# Patient Record
(demographics unavailable — no encounter records)

---

## 2019-06-04 NOTE — PHYS DOC
Adult General


Chief Complaint


Chief Complaint:  SHORTNESS OF BREATH





HPI


HPI





Patient is a 78  year old male who presents with multiple complaints. Patient 

states that he has been having shortness of air, cough, dizziness that has been 

ongoing for 3 weeks. She also states that he's had a facial droop that started a

week ago, also has had right-sided weakness that started a week ago and that has

been making it difficult to eat in the mornings however it improves as the day 

goes on. Denies any pain currently. Has unknown what medicines this individual 

is on as he does not have medicine list. Hx of strokes. He is a poor historian.





Review of Systems


Review of Systems





Constitutional: Denies fever or chills []


Eyes: Denies change in visual acuity, redness, or eye pain []


HENT: Denies nasal congestion or sore throat []


Respiratory: Reports cough and shortness of breath []


Cardiovascular: No additional information not addressed in HPI []


GI: Denies abdominal pain, nausea, vomiting, bloody stools or diarrhea []


: Denies dysuria or hematuria []


Musculoskeletal: Denies back pain or joint pain []


Integument: Denies rash or skin lesions []


Neurologic: Reports dizziness that gets better as the day progresses. Denies 

headache, focal weakness or sensory changes []


Endocrine: Denies polyuria or polydipsia []





Complete systems were reviewed and found to be within normal limits, except as 

documented in this note.





Current Medications


Current Medications








Allergies


Allergies





Allergies








Coded Allergies Type Severity Reaction Last Updated Verified


 


  No Known Drug Allergies    19 No











Physical Exam


Physical Exam





Constitutional: Well developed, well nourished, no acute distress, non-toxic 

appearance. []


HENT: Normocephalic, atraumatic, bilateral external ears normal, oropharynx 

moist, no oral exudates, nose normal. []


Eyes: PERRLA, EOMI, conjunctiva normal, no discharge. [] 


Neck: Normal range of motion, no tenderness, supple, no stridor. [] 


Cardiovascular:Heart rate regular rhythm, no murmur []


Lungs & Thorax:  Bilateral breath sounds clear to auscultation []


Abdomen: Bowel sounds normal, soft, no tenderness, no masses, no pulsatile 

masses. [] 


Skin: Warm, dry, no erythema, no rash. [] 


Back: No tenderness, no CVA tenderness. [] 


Extremities: No tenderness, no cyanosis, no clubbing, ROM intact, no edema. [] 


Neurologic: Alert and oriented X 3, normal motor function, normal sensory 

function, no focal deficits noted. []


Psychologic: Affect normal, judgement normal, mood normal. []





NIHSS of 3.





Administer stroke scale items in the order listed.  Record performance in each 

category after each subscale exam.  Do not go back and change scores.  Follow 

directions provided for each exam technique.  Scores should reflect what the 

patient does, not what the clinician thinks the patient can do.  The clinician 

should record answers while administering the exam and work quickly. Except 

where indicated, the patient should not be coached (i.e., repeated requests to 

patient to make a special effort).








1a.    Level  of  Consciousness:  The  investigator  must  choose  a response if

 a full evaluation is prevented by such obstacles as an endotracheal tube, 

language barrier, orotracheal trauma/bandages.  A 3 is scored only if the 

patient makes no movement (other than reflexive posturing) in response to 

noxious stimulation.   





0 =   Alert; keenly responsive.


1 =   Not alert; but arousable by minor stimulation to obey, answer, or respond.


2 =   Not alert; requires repeated stimulation to attend, or is obtunded and 

requires strong or painful stimulation to make movements (not stereotyped).


3 =   Responds only with reflex motor or autonomic effects or totally 

unresponsive, flaccid, and areflexic.       





1b.  LOC Questions:  The patient is asked the month and his/her age. The answer 

must be correct - there is no partial credit for being close. Aphasic and 

stuporous patients who do not comprehend the questions will score 2.   Patients 

unable to speak because of endotracheal intubation, orotracheal trauma, severe 

dysarthria from any cause, language barrier, or any other problem not secondary 

to aphasia are given a 1.  It is important that only the initial answer be 

graded and that the examiner not "help" the patient with verbal or non-verbal 

cues.


   


0 =    Answers both questions correctly.


1 =    Answers one question correctly.


2 =    Answers neither question correctly.   





1c.   LOC Commands:    The patient is asked to open and close the eyes and then 

to  and release the non-paretic hand.   Substitute another one step command 

if the hands cannot be used.   Credit is given if an unequivocal attempt is made

 but not completed due to weakness.   If the patient does not respond to comm

and, the task should be demonstrated to him or her (pantomime), and the result 

scored  (i.e.,  follows  none,  one  or  two  commands).    Patients  with 

trauma, amputation, or other physical impediments should be given suitable one-

step commands. Only the first attempt is scored.





0 = Performs both tasks correctly.


1 = Performs one task correctly.


2 = Performs neither task correctly.   





2.   Best Gaze:   Only horizontal eye movements will be tested. Voluntary or 

reflexive (oculocephalic) eye movements will be scored, but  caloric  testing  

is  not  done.    If  the  patient  has  a  conjugate deviation of the eyes that

 can be overcome by voluntary or reflexive activity, the score will be 1.   If a

 patient has an isolated peripheral nerve paresis   (CN III, IV or VI), score a 

1.  Gaze is testable in all aphasic patients. Patients with ocular trauma, 

bandages, pre-existing blindness, or other disorder of visual acuity or fields 

should be tested with reflexive movements, and a choice made by the 

investigator. Establishing eye contact and then moving about the patient from 

side to side will occasionally clarify the presence of a partial gaze palsy.


   


0 = Normal.


1 = Partial gaze palsy; gaze is abnormal in one or both eyes, but forced 

deviation or total gaze paresis is not present.


2 = Forced deviation, or total gaze paresis not overcome by the oculocephalic 

maneuver.   











    


 








Interval:   [ ] Baseline   [ ] 2 hours post treatment   [ ] 24 hours post onset 

of symptoms 20 minutes   [ ] 7-10 days


[ ] 3 months   [ ] Other     (        )











3.  Visual:   Visual fields (upper and lower quadrants) are tested by 

confrontation, using finger counting or visual threat, as appropriate. Patients 

may be  encouraged, but if  they look at  the side of  the moving fingers 

appropriately, this can be scored as normal. If there is unilateral blindness or

enucleation, visual fields in the remaining eye are scored.   Score 1 only if a 

clear-cut asymmetry, including quadrantanopia, is found.  If patient is blind 

from any cause, score 3. Double simultaneous stimulation is performed at this 

point.  If there is extinction, patient receives a 1, and the results are used 

to respond to item 11.   


0 = No visual loss.





1 = Partial hemianopia.





2 = Complete hemianopia.





3 = Bilateral hemianopia (blind including cortical blindness).   





4.  Facial Palsy:  Ask - or use pantomime to encourage - the patient to show 

teeth or raise eyebrows and close eyes.  Score symmetry of grimace in response 

to noxious stimuli in the poorly responsive or non-comprehending patient.   If 

facial trauma/bandages, orotracheal tube, tape or other physical barriers 

obscure the face, these should be removed to the extent possible.   


0 = Normal symmetrical movements.


1 = Minor paralysis (flattened nasolabial fold, asymmetry on smiling).


2 = Partial paralysis (total or near-total paralysis of lower


face).


3 = Complete paralysis of one or both sides (absence of facial movement in the 

upper and lower face).   











    





5.  Motor Arm:  The limb is placed in the appropriate position: extend the  arms

 (palms  down)  90  degrees  (if  sitting)  or  45  degrees  (if supine).  Drift

is scored if the arm falls before 10 seconds.  The aphasic patient is encouraged

using urgency in the voice and pantomime, but not noxious stimulation.  Each 

limb is tested in turn, beginning with the non-paretic arm. Only in the case of 

amputation or joint fusion at the shoulder, the examiner should record the score

as untestable (UN), and clearly write the explanation for this choice.   


0 = No drift;  limb holds 90 (or 45) degrees for full 10 seconds.


1 = Drift; limb holds 90 (or 45) degrees, but drifts down before full 10 

seconds; does not hit bed or other support.


2 = Some effort against gravity; limb cannot get to or maintain (if cued) 90 (or

45) degrees, drifts down to bed, but has some effort against gravity.


3 = No effort against gravity; limb falls.


4 = No movement.


UN = Amputation or joint fusion, explain:      





5a. Left Arm





5b. Right Arm   





6.  Motor  Leg:   The limb is placed in the appropriate position:  hold the leg 

at 30 degrees (always tested supine).  Drift is scored if the leg falls before 5

seconds.  The aphasic patient is encouraged using urgency in the voice and 

pantomime, but not noxious stimulation. Each limb is tested in turn, beginning 

with the non-paretic leg.  Only in the case of amputation or joint fusion at the

hip, the examiner should record the score as untestable (UN), and clearly write 

the explanation for this choice.   


0 = No drift;  leg holds 30-degree position for full 5 seconds.


1 = Drift; leg falls by the end of the 5-second period but does not hit bed.


2 = Some effort against gravity; leg falls to bed by 5


seconds, but has some effort against gravity.


3 = No effort against gravity; leg falls to bed immediately.


4 = No movement.


UN = Amputation or joint fusion, explain:     





6a. Left Leg








6b. Right Leg   


 








Interval:   [ ] Baseline   [ ] 2 hours post treatment   [ ] 24 hours post onset 

of symptoms 20 minutes   [ ] 7-10 days


[ ] 3 months   [ ] Other     (        )





      





7. Limb Ataxia:  This item is aimed at finding evidence of a unilateral 

cerebellar lesion.   Test with eyes open.   In case of visual defect, ensure 

testing is done in intact visual field.   The finger-nose-finger and heel-shin 

tests are performed on both sides, and ataxia is scored only if present out of 

proportion to weakness.  Ataxia is absent in the patient who cannot understand 

or is paralyzed.  Only in the case of amputation or joint fusion, the examiner 

should record the score as untestable (UN), and clearly write the explanation 

for this choice.  In case  of  blindness,  test  by  having  the  patient  touch

 nose  from extended arm position.   


0 = Absent.





1 = Present in one limb.





2 = Present in two limbs.





UN = Amputation or joint fusion, explain:         





8.   Sensory:    Sensation or grimace to pinprick when tested, or withdrawal 

from noxious stimulus in the obtunded or aphasic patient. Only sensory loss 

attributed to stroke is scored as abnormal and the examiner should test as many 

body areas (arms [not hands], legs, trunk, face) as needed to accurately check 

for hemisensory loss.  A score of 2, "severe or total sensory loss," should only

be given when a severe or total loss of sensation can be clearly demonstrated. 

Stuporous and aphasic patients will, therefore, probably score 1 or 0. The 

patient with brainstem stroke who has bilateral loss of sensation is scored 2.  

If the patient does not respond and is quadriplegic, score


2.  Patients in a coma (item 1a=3) are automatically given a 2 on this


item.   


0 = Normal; no sensory loss.





1 = Mild-to-moderate sensory loss; patient feels pinprick is less sharp or is 

dull on the affected side; or there is a loss of superficial pain with pinprick,

but patient is aware of being touched.





2 = Severe to total sensory loss; patient is not aware of being touched in the 

face, arm, and leg.   





9. Best Language:  A great deal of information about comprehension will be 

obtained during the preceding sections of the examination. For this scale item, 

the patient is asked to describe what is happening in the attached picture, to 

name the items on the attached naming sheet  and  to  read  from  the  attached 

list  of  sentences. Comprehension is judged from responses here, as well as to 

all of the commands in the preceding general neurological exam.  If visual loss 

interferes with the tests, ask the patient to identify objects placed in the 

hand, repeat, and produce speech.   The intubated patient should be asked to 

write. The patient in a coma (item 1a=3) will automatically score 3 on this 

item.   The examiner must choose a score for the patient with stupor or limited 

cooperation, but a score of


3 should be used only if the patient is mute and follows no one-step commands.

   


0 = No aphasia; normal.





1 = Mild-to-moderate aphasia; some obvious loss of fluency or facility of 

comprehension, without significant limitation on ideas expressed or form of 

expression. Reduction of speech and/or comprehension, however, makes 

conversation about provided materials difficult or impossible. For example, in 

conversation about provided materials, examiner can identify picture or naming 

card content from patient's response.





2 = Severe aphasia; all communication is through fragmentary expression; great 

need for inference, questioning, and guessing by the listener. Range of 

information that can be exchanged is limited; listener carries burden of 

communication. Examiner cannot identify materials provided from patient 

response.





3 = Mute, global aphasia; no usable speech or auditory comprehension.   





10.   Dysarthria: If  patient is  thought to  be  normal, an  adequate sample of

speech must be obtained by asking patient to read or repeat  words  from  the  

attached  list.    If  the  patient  has  severe aphasia, the clarity of 

articulation of spontaneous speech can be rated.  Only if the patient is 

intubated or has other physical barriers to producing  speech,  the   examiner  

should  record  the   score  as untestable (UN), and clearly write an 

explanation for this choice.  Do not tell the patient why he or she is being 

tested.   


0 = Normal.


1 = Mild-to-moderate dysarthria; patient slurs at least some words and, at wors

t, can be understood with some difficulty.


2 = Severe dysarthria; patient's speech is so slurred as to be


unintelligible in the absence of or out of proportion to any dysphasia, or is 

mute/anarthric.


UN = Intubated or other physical barrier,


explain:       


 








Interval:   [ ] Baseline   [ ] 2 hours post treatment   [ ] 24 hours post onset 

of symptoms 20 minutes   [ ] 7-10 days


[ ] 3 months   [ ] Other     (        )











 


11.   Extinction and Inattention (formerly Neglect):    Sufficient information  

to  identify  neglect  may  be  obtained  during  the  prior testing.   If the 

patient has a severe visual loss preventing visual double simultaneous 

stimulation, and the cutaneous stimuli are normal, the score is normal.  If the 

patient has aphasia but does appear to attend to both sides, the score is jyothi

l.  The presence of visual spatial neglect or anosagnosia may also be taken as 

evidence of abnormality.  Since the abnormality is scored only if present, the 

item is never untestable.


 


0 = No abnormality.





1 = Visual, tactile, auditory, spatial, or personal inattention


or extinction to bilateral simultaneous stimulation in one       


of the sensory modalities.





2 = Profound alex-inattention or extinction to more than one modality; does not 

recognize own hand or orients to only one side of space.





Current Patient Data


Vital Signs





                                   Vital Signs








  Date Time  Temp Pulse Resp B/P (MAP) Pulse Ox O2 Delivery O2 Flow Rate FiO2


 


19 16:00 98.6 71 23 184/86 (118) 95 Room Air  





 98.6       








Lab Values





                                Laboratory Tests








Test


 19


16:12 19


16:17


 


White Blood Count


 6.0 x10^3/uL


(4.0-11.0) 





 


Red Blood Count


 2.00 x10^6/uL


(4.30-5.70)  L 





 


Hemoglobin


 6.0 g/dL


(13.0-17.5)  *L 





 


Hematocrit


 18.5 %


(39.0-53.0)  *L 





 


Mean Corpuscular Volume


 93 fL ()


 





 


Mean Corpuscular Hemoglobin 30 pg (25-35)   


 


Mean Corpuscular Hemoglobin


Concent 33 g/dL


(31-37) 





 


Red Cell Distribution Width


 14.9 %


(11.5-14.5)  H 





 


Platelet Count


 222 x10^3/uL


(140-400) 





 


Neutrophils (%) (Auto) 56 % (31-73)   


 


Lymphocytes (%) (Auto) 26 % (24-48)   


 


Monocytes (%) (Auto) 14 % (0-9)  H 


 


Eosinophils (%) (Auto) 3 % (0-3)   


 


Basophils (%) (Auto) 2 % (0-3)   


 


Neutrophils # (Auto)


 3.3 x10^3uL


(1.8-7.7) 





 


Lymphocytes # (Auto)


 1.6 x10^3/uL


(1.0-4.8) 





 


Monocytes # (Auto)


 0.8 x10^3/uL


(0.0-1.1) 





 


Eosinophils # (Auto)


 0.2 x10^3/uL


(0.0-0.7) 





 


Basophils # (Auto)


 0.1 x10^3/uL


(0.0-0.2) 





 


Prothrombin Time


 28.2 SEC


(11.7-14.0)  H 





 


Prothrombin Time INR


 2.7 (0.8-1.1)


H 





 


PTT


 43 SEC (24-38)


H 





 


Sodium Level


 142 mmol/L


(136-145) 





 


Potassium Level


 4.1 mmol/L


(3.5-5.1) 





 


Chloride Level


 107 mmol/L


() 





 


Carbon Dioxide Level


 24 mmol/L


(21-32) 





 


Anion Gap 11 (6-14)   


 


Blood Urea Nitrogen


 20 mg/dL


(8-26) 





 


Creatinine


 0.9 mg/dL


(0.7-1.3) 





 


Estimated GFR


(Cockcroft-Gault) 81.6  


 





 


BUN/Creatinine Ratio 22 (6-20)  H 


 


Glucose Level


 118 mg/dL


(70-99)  H 





 


Calcium Level


 8.3 mg/dL


(8.5-10.1)  L 





 


Total Bilirubin


 0.4 mg/dL


(0.2-1.0) 





 


Aspartate Amino Transferase


(AST) 21 U/L (15-37)


 





 


Alanine Aminotransferase (ALT)


 42 U/L (16-63)


 





 


Alkaline Phosphatase


 59 U/L


() 





 


Troponin I Quantitative


 < 0.017 ng/mL


(0.000-0.055) 





 


NT-Pro-B-Type Natriuretic


Peptide 3879 pg/mL


(0-449)  H 





 


Total Protein


 5.9 g/dL


(6.4-8.2)  L 





 


Albumin


 3.2 g/dL


(3.4-5.0)  L 





 


Albumin/Globulin Ratio 1.2 (1.0-1.7)   


 


Lipase


 143 U/L


() 





 


Urine Collection Type  Unknown  


 


Urine Color  Yellow  


 


Urine Clarity  Clear  


 


Urine pH  6.5  


 


Urine Specific Gravity  1.015  


 


Urine Protein


 


 30 mg/dL


(NEG-TRACE)


 


Urine Glucose (UA)


 


 Negative mg/dL


(NEG)


 


Urine Ketones (Stick)


 


 Negative mg/dL


(NEG)


 


Urine Blood


 


 Negative (NEG)





 


Urine Nitrite


 


 Negative (NEG)





 


Urine Bilirubin


 


 Negative (NEG)





 


Urine Urobilinogen Dipstick


 


 1.0 mg/dL (0.2


mg/dL)


 


Urine Leukocyte Esterase


 


 Negative (NEG)





 


Urine RBC


 


 1-2 /HPF (0-2)





 


Urine WBC


 


 Occ /HPF (0-4)





 


Urine Squamous Epithelial


Cells 


 Occ /LPF  





 


Urine Bacteria


 


 0 /HPF (0-FEW)








                                Laboratory Tests


19 16:12








                                Laboratory Tests


19 16:12











EKG


EKG


EKG interpreted by Dr. Schwab.[]





Left Bundle Branch Block, Sinus of 73, No STEMI





Radiology/Procedures


Radiology/Procedures


[]PATIENT: JOHN BECKMANACCOUNT: [id number]#: [id number]


: 1940           LOCATION: ER              AGE: 78


SEX: M                    EXAM DT: 19         ACCESSION#: [id number]


STATUS: REG ER            ORD. PHYSICIAN: JUAN DIEGO GLEASON   


REASON: R sided weakness and droop, short of air


PROCEDURE: CT ANGIOGRAPHY CHEST





CT ANGIOGRAPHY CHEST


 


Indication: Right-sided weakness. Shortness of breath. .


 


Technique: After intravenous contrast administration, CT imaging was 


performed of the chest. MIP reconstructions were obtained.


 


Exposure: One or more of the following individualized dose reduction 


techniques were utilized for this examination:  1. Automated exposure 


control  2. Adjustment of the mA and/or kV according to patient size  3. 


Use of iterative reconstruction technique.


 


No prior study for comparison


 


FINDINGS:


No evidence of pulmonary embolism. The ascending aorta measures 3.9 cm 


compatible with mild ectasia. No descending aortic aneurysm. Mild aortic 


calcification. There is a limited opacification of the aorta due to 


technique but no definite dissection.


 


No significant lymph node enlargement. No evidence of thyroid mass. 


Coronary artery calcification. Heart size mildly enlarged. No pericardial 


effusion.


 


Small left pleural effusion. Small-to-moderate right pleural effusion.


 


Prominent interstitial opacities in both lungs. There is some atelectasis.


No dense lobar consolidation. There does appear to be mild central 


pulmonary venous congestion. The trachea and mainstem bronchi are patent. 


Mild bronchial wall thickening bilaterally.


 


Mild degenerative changes of the spine. There is deformity of the inferior


sternum presumably due to an old fracture. No evidence of destructive bone


lesion.


 


Scans through the upper abdomen are limited by technique. There is a round


density within the gallbladder fossa compatible with a gallstone in a 


contracted gallbladder or possibly milk of calcium bile in a contracted 


gallbladder.


 


IMPRESSION:


1. No evidence of pulmonary embolism.


2. Bilateral pleural effusions.


3. Interstitial opacities in both lungs, with a probable component of mild


pulmonary edema and vascular congestion. Correlate clinically for 


congestive failure. Interstitial pneumonitis is also possible.


4. Round density within the gallbladder fossa. The gallbladder itself is 


difficult to define. This could represent a gallstone or dense bile within


a contracted gallbladder. Gallbladder ultrasound could further evaluate.


5. Mild ascending aorta ectasia.


 


Electronically signed by: Juan Diego Paniagua MD (2019 5:25 PM) [facility]











PATIENT: JOHN BECKMAN    ACCOUNT: [id number]     MRN#: [id number]


: 1940           LOCATION: ER              AGE: 78


SEX: M                    EXAM DT: 19         ACCESSION#: [id number]


STATUS: REG ER            ORD. PHYSICIAN: JUAN DIEGO GLEASON   


REASON: soa


PROCEDURE: PORTABLE CHEST 1V





PORTABLE CHEST 1V


 


History: Shortness of breath..


 


No prior for comparison. Cardiac silhouette is enlarged. Elevation of left


hemidiaphragm/lung base. No evidence of pneumothorax. No evidence of 


effusion. Diffuse interstitial opacities. No dense lobar airspace 


consolidation.


 


IMPRESSION: Diffuse interstitial opacities, could represent chronic 


fibrosis versus more acute edema/pneumonitis.


 


Electronically signed by: Juan Diego Paniagua MD (2019 5:15 PM) [facility]








PATIENT: JOHN BECKMAN    ACCOUNT: [id number]     MRN#: [id number]


: 1940           LOCATION: ER              AGE: 78


SEX: M                    EXAM DT: 19         ACCESSION#: [id number]


STATUS: REG ER            ORD. PHYSICIAN: JUAN DIEGO GLEASON   


REASON: R sided weakness and droop


PROCEDURE: CT HEAD WO CONTRAST





EXAM: Head CT without contrast.


 


HISTORY: Right-sided weakness and droop.


 


TECHNIQUE: Computed tomographic images of the head were obtained without 


contrast. 


*One or more of the following individualized dose reduction techniques 


were utilized for this examination:  


1. Automated exposure control.  


2. Adjustment of the mA and/or kV according to patient size.  


3. Use of iterative reconstruction technique.


 


COMPARISON: None.


 


FINDINGS: There is no acute or subacute extra-axial or intraparenchymal 


hemorrhage. There is no mass effect or midline shift. There is no 


hydrocephalus. 


 


There are areas of decreased attenuation within the cerebral white matter,


nonspecific and likely related to chronic small vessel disease. There is 


focal hypodensity within the left basal ganglia and adjacent centrum 


semiovale, likely due to chronic infarction. There are also suspected 


chronic infarct within the right basal ganglia an left ana.


 


There is paranasal sinus mucosal thickening. There is a chronic right 


lamina appreciable fracture. There is evidence of right lens surgery. The 


mastoid air cells are clear. There is no suspicious osseous lesion.


 


IMPRESSION:


1. No acute intracranial finding. Note is made that MRI is more sensitive 


for acute infarction.


2. Suspected chronic infarcts within the left basal ganglia and adjacent 


centrum semiovale, right basal ganglia and left ana. These can be better 


characterized with MRI.


3. Scattered areas of hypodensity within the cerebral white matter, likely


due to chronic small vessel disease.


 


Electronically signed by: Angela Frankel MD (2019 5:07 PM) [facility]





Course & Med Decision Making


Course & Med Decision Making


Pertinent Labs and Imaging studies reviewed. (See chart for details)





Discussed getting labs, CT, will call Dr. Barber's office for medicine list and 

history. 





Has hemoglobin of 6.0. Will type and cross and order 1 unit of RBC.  BNP is 

elevated.





Will call Dr. Barber for admission.





Dr. Barber agreed to admission. Will consult GI, and order lasix.





Dragon Disclaimer


Dragon Disclaimer


This electronic medical record was generated, in whole or in part, using a voice

 recognition dictation system.





Departure


Departure


Impression:  


   Primary Impression:  


   Anemia


   Additional Impression:  


   Acute exacerbation of CHF (congestive heart failure)


Disposition:   ADMITTED AS INPATIENT


Admitting Physician:  Juan Diego Barber


Referrals:  


JUAN DIEGO BARBER MD (PCP)





Problem Qualifiers








   Primary Impression:  


   Anemia


   Anemia type:  unspecified type  Qualified Codes:  D64.9 - Anemia, unspecified


   Additional Impression:  


   Acute exacerbation of CHF (congestive heart failure)


   Heart failure type:  unspecified  Qualified Codes:  I50.9 - Heart failure, 

   unspecified








JUAN DIEGO GLEASON           2019 16:22

## 2019-06-04 NOTE — RAD
PORTABLE CHEST 1V

 

History: Shortness of breath..

 

No prior for comparison. Cardiac silhouette is enlarged. Elevation of left

hemidiaphragm/lung base. No evidence of pneumothorax. No evidence of 

effusion. Diffuse interstitial opacities. No dense lobar airspace 

consolidation.

 

IMPRESSION: Diffuse interstitial opacities, could represent chronic 

fibrosis versus more acute edema/pneumonitis.

 

Electronically signed by: Juan Diego Paniagua MD (6/4/2019 5:15 PM) [facility]

## 2019-06-04 NOTE — EKG
[facility]

              [address] [place] [address]

Test Date:    2019               Test Time:    15:55:57

Pat Name:     JOHN BECKMAN            Department:   

Patient ID:   [id number]           Room:          

Gender:       M                        Technician:   

:          1940               Requested By: JONATHAN GLEASON

Order Number: [id number]           Reading MD:     

                                 Measurements

Intervals                              Axis          

Rate:         72                       P:            0

LA:           188                      QRS:          -28

QRSD:         138                      T:            83

QT:           410                                    

QTc:          455                                    

                           Interpretive Statements

SINUS RHYTHM

LEFTWARD AXIS

LEFT BUNDLE BRANCH BLOCK

ABNORMAL ECG

No previous ECG available for comparison

## 2019-06-04 NOTE — RAD
CT ANGIOGRAPHY CHEST

 

Indication: Right-sided weakness. Shortness of breath. .

 

Technique: After intravenous contrast administration, CT imaging was 

performed of the chest. MIP reconstructions were obtained.

 

Exposure: One or more of the following individualized dose reduction 

techniques were utilized for this examination:  1. Automated exposure 

control  2. Adjustment of the mA and/or kV according to patient size  3. 

Use of iterative reconstruction technique.

 

No prior study for comparison

 

FINDINGS:

No evidence of pulmonary embolism. The ascending aorta measures 3.9 cm 

compatible with mild ectasia. No descending aortic aneurysm. Mild aortic 

calcification. There is a limited opacification of the aorta due to 

technique but no definite dissection.

 

No significant lymph node enlargement. No evidence of thyroid mass. 

Coronary artery calcification. Heart size mildly enlarged. No pericardial 

effusion.

 

Small left pleural effusion. Small-to-moderate right pleural effusion.

 

Prominent interstitial opacities in both lungs. There is some atelectasis.

No dense lobar consolidation. There does appear to be mild central 

pulmonary venous congestion. The trachea and mainstem bronchi are patent. 

Mild bronchial wall thickening bilaterally.

 

Mild degenerative changes of the spine. There is deformity of the inferior

sternum presumably due to an old fracture. No evidence of destructive bone

lesion.

 

Scans through the upper abdomen are limited by technique. There is a round

density within the gallbladder fossa compatible with a gallstone in a 

contracted gallbladder or possibly milk of calcium bile in a contracted 

gallbladder.

 

IMPRESSION:

1. No evidence of pulmonary embolism.

2. Bilateral pleural effusions.

3. Interstitial opacities in both lungs, with a probable component of mild

pulmonary edema and vascular congestion. Correlate clinically for 

congestive failure. Interstitial pneumonitis is also possible.

4. Round density within the gallbladder fossa. The gallbladder itself is 

difficult to define. This could represent a gallstone or dense bile within

a contracted gallbladder. Gallbladder ultrasound could further evaluate.

5. Mild ascending aorta ectasia.

 

Electronically signed by: Juan Diego Paniagua MD (6/4/2019 5:25 PM) [facility]

## 2019-06-04 NOTE — RAD
EXAM: Head CT without contrast.

 

HISTORY: Right-sided weakness and droop.

 

TECHNIQUE: Computed tomographic images of the head were obtained without 

contrast. 

*One or more of the following individualized dose reduction techniques 

were utilized for this examination:  

1. Automated exposure control.  

2. Adjustment of the mA and/or kV according to patient size.  

3. Use of iterative reconstruction technique.

 

COMPARISON: None.

 

FINDINGS: There is no acute or subacute extra-axial or intraparenchymal 

hemorrhage. There is no mass effect or midline shift. There is no 

hydrocephalus. 

 

There are areas of decreased attenuation within the cerebral white matter,

nonspecific and likely related to chronic small vessel disease. There is 

focal hypodensity within the left basal ganglia and adjacent centrum 

semiovale, likely due to chronic infarction. There are also suspected 

chronic infarct within the right basal ganglia an left ana.

 

There is paranasal sinus mucosal thickening. There is a chronic right 

lamina appreciable fracture. There is evidence of right lens surgery. The 

mastoid air cells are clear. There is no suspicious osseous lesion.

 

IMPRESSION:

1. No acute intracranial finding. Note is made that MRI is more sensitive 

for acute infarction.

2. Suspected chronic infarcts within the left basal ganglia and adjacent 

centrum semiovale, right basal ganglia and left ana. These can be better 

characterized with MRI.

3. Scattered areas of hypodensity within the cerebral white matter, likely

due to chronic small vessel disease.

 

Electronically signed by: Angela Frankel MD (6/4/2019 5:07 PM) [facility]

## 2019-06-05 NOTE — HP
ADMIT DATE:  2019



CHIEF COMPLAINT:  Shortness of breath.



HISTORY OF PRESENT ILLNESS AND HOSPITAL COURSE:  This patient is a 78-year-old

 male with known history of coronary artery disease, previous CVA and

chronic atrial fibrillation, came to the office complaining of shortness of

breath.  He was found to have a 10-pound weight gain and crackles on

auscultation and evidence of congestive heart failure.  Therefore, he was

transferred directly to the Emergency Room for further evaluation.  Of note, the

patient's INR was 2.3 and stable.  During hospital evaluation, he was found to

have significant anemia of hemoglobin less than 7 as well as evidence of

congestive heart failure on chest x-ray and BNP.  Due to these findings, he was

directly admitted to the hospital for further evaluation initially by GI

Medicine and subsequently by Cardiology.  He was diuresed and given transfusion

and admitted to a monitored bed.



PAST MEDICAL HISTORY:  Significant for:

1.  Coronary artery disease, status post MI.

2.  Chronic atrial fibrillation.

3.  Previous cerebrovascular accident.

4.  Type 2 diabetes.

5.  Hypertension.

6.  High cholesterol.

7.  BPH.



ALLERGIES:  The patient exhibits no allergies.



FAMILY HISTORY:  Significant for mother who was  with complications of

diabetes and heart disease; father who was  with complications of

prostate cancer and heart disease; a brother who was  with diabetes and

heart disease and a sister who was  with complications of diabetes.



SOCIAL HISTORY:  The patient has never smoked.  He does not use alcohol

significantly; did use alcohol 15 years ago.  The patient lives alone, but has

excellent social support from his daughter and family members.  The patient

recently traveled from [place] and since that trip, he has been having increasing

shortness of breath over the last 2 weeks.



MEDICATIONS  ON ADMISSION:  Lisinopril 10 mg daily, metoprolol 50 mg b.i.d.,

Coumadin 2 mg daily, metformin 500 mg b.i.d., atorvastatin 20 mg daily,

omeprazole 20 mg daily, Myrbetriq 25 mg daily, tamsulosin 0.4 mg 2 tablets

daily, finasteride 5 mg daily.



REVIEW OF SYSTEMS:  Significant for recent weight gain of approximately 10

pounds, increasing shortness of breath, decreased ability to walk and

right-sided weakness consistent with his previous CVA, shortness of breath and

family states that the patient has been somewhat jaundiced appearing in the last

week or so.  The patient denies any nausea, vomiting, diarrhea, fever, cough, or

congestion.



PHYSICAL EXAMINATION:

GENERAL:  This is a well-nourished 78-year-old  male, in mild distress. 

He is alert and oriented x 3.

HEENT:  Benign with facial droop from previous cerebrovascular accident.

CARDIAC:  Irregularly irregular with a grade 2/3 systolic ejection murmur.

LUNGS:  Clear anteriorly with crackles in the bases.

ABDOMEN:  Soft, nontender.

EXTREMITIES:  There are 2+ pulses with 2+ pitting edema.

NEUROLOGIC:  Showed previous CVA findings and some right-sided weakness to his

arm more than usual.



ASSESSMENT:

1.  Anemia of unknown etiology.

2.  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

failure.

3.  Type 2 diabetes.

4.  Hypertension.

5.  High cholesterol.

6.  Chronic atrial fibrillation.

7.  Previous cerebrovascular accident with some right-sided residual.

8.  Coronary artery disease.

9.  Benign prostatic hypertrophy with history of retention.

10.  Long-term anticoagulation on Coumadin.

11.  Ejection fraction of 30%.

12.  Mild pulmonary hypertension.



PLAN:  To proceed with cardiology consultation, diurese the patient and proceed

with blood transfusion as ordered by ER and monitor the patient's symptoms.

 



______________________________

JONATHAN LU MD



DR:  LYNDSAY/pema  JOB#:  [id number] / [id number]

DD:  2019 13:10  DT:  2019 18:54

## 2019-06-05 NOTE — PDOC2
JULI CAST APRN 6/5/19 1445:


CARDIAC CONSULT


DATE OF CONSULT


Date of Consult


DATE: 6/5/19 


TIME: 14:24





REASON FOR CONSULT


Reason for Consult:


CHF





REFERRING PHYSICIAN


Referring Physician:


Elisabeth





SOURCE


Source:  Chart review, Patient





HISTORY OF PRESENT ILLNESS


HISTORY OF PRESENT ILLNESS


This is a pleasant 79 yo  male admitted for complains of SOA and 

weakness. Also complains of stool being black.  He has been having orthopnea and

ZAYAS in the last 2 days with some leg swelling.  He has been taking warfarin for 

his AFIB and currently he does not follow any cardiologist.  Denies any chest 

pain or palpitations.  He does have hx of CVA and CAD. No passing out or 

frequent dizziness. No prior hx of GI bleed. No abdominal pain but noted his 

stool to be black.





PAST MEDICAL HISTORY


Cardiovascular:  AFIB, CAD, HTN, MI, Hyperlipidemia


CENTRAL NERVOUS SYSTEM:  CVA


Renal/:  Benign prostatic enlarg.


Endocrine:  Diabetes (2)





PAST SURGICAL HISTORY


Past Surgical History:  No pertinent history





FAMILY HISTORY


Family History:  Diabetes, Heart Disease





SOCIAL HISTORY


Smoke:  No


ALCOHOL:  occassional


Drugs:  None


Lives:  with Family





CURRENT MEDICATIONS


CURRENT MEDICATIONS





Current Medications








 Medications


  (Trade)  Dose


 Ordered  Sig/Shyanne


 Route


 PRN Reason  Start Time


 Stop Time Status Last Admin


Dose Admin


 


 Iohexol


  (Omnipaque 350


 Mg/ml)  90 ml  1X  ONCE


 IV


   6/4/19 17:00


 6/4/19 17:01 DC 6/4/19 17:00





 


 Furosemide


  (Lasix)  20 mg  1X  ONCE


 IVP


   6/4/19 17:45


 6/4/19 17:46 DC 6/4/19 17:56





 


 Pantoprazole


 Sodium


  (Protonix)  40 mg  DAILY08


 PO


   6/4/19 21:00


    6/5/19 10:16





 


 Finasteride


  (Proscar)  5 mg  DAILY


 PO


   6/5/19 10:00


    6/5/19 10:16





 


 Lisinopril


  (Prinivil)  10 mg  DAILY


 PO


   6/5/19 10:00


    6/5/19 10:18





 


 Metoprolol


 Tartrate


  (Lopressor)  50 mg  BID


 PO


   6/5/19 10:00


    6/5/19 10:16





 


 Tamsulosin HCl


  (Flomax)  0.8 mg  DAILY


 PO


   6/5/19 10:00


    6/5/19 10:16














ALLERGIES


ALLERGIES:  


Coded Allergies:  


     No Known Drug Allergies (Unverified , 6/4/19)





ROS


Review of System


14 point ROs evaluated with pertinent positives noted per HPI





PHYSICAL EXAM


General:  Alert, Oriented X3, Cooperative, No acute distress


HEENT:  Atraumatic, Mucous membr. moist/pink


Lungs:  Other (faint basilar crackles)


Heart:  Regular rate (Sr with LBBB), Other (3/6 systolic murmur to ARYAN border)


Abdomen:  Soft, No tenderness


Extremities:  No cyanosis, No edema


Skin:  No breakdown, No significant lesion


Neuro:  Normal speech, Sensation intact


Psych/Mental Status:  Mental status NL, Mood NL


MUSCULOSKELETAL:  Osteoarthritic changes both hands





VITALS


VITALS





Vital Signs








  Date Time  Temp Pulse Resp B/P (MAP) Pulse Ox O2 Delivery O2 Flow Rate FiO2


 


6/5/19 11:19 97.9 67 18 156/77 (103) 96 Room Air  





 97.9       


 


6/5/19 07:25       2 











LABS


Lab:





Laboratory Tests








Test


 6/4/19


16:12 6/4/19


16:17 6/4/19


21:31 6/5/19


02:55


 


White Blood Count


 6.0 x10^3/uL


(4.0-11.0) 


 


 6.8 x10^3/uL


(4.0-11.0)


 


Red Blood Count


 2.00 x10^6/uL


(4.30-5.70) 


 


 2.74 x10^6/uL


(4.30-5.70)


 


Hemoglobin


 6.0 g/dL


(13.0-17.5) 


 


 8.2 g/dL


(13.0-17.5)


 


Hematocrit


 18.5 %


(39.0-53.0) 


 


 24.3 %


(39.0-53.0)


 


Mean Corpuscular Volume 93 fL ()    89 fL () 


 


Mean Corpuscular Hemoglobin 30 pg (25-35)    30 pg (25-35) 


 


Mean Corpuscular Hemoglobin


Concent 33 g/dL


(31-37) 


 


 34 g/dL


(31-37)


 


Red Cell Distribution Width


 14.9 %


(11.5-14.5) 


 


 16.4 %


(11.5-14.5)


 


Platelet Count


 222 x10^3/uL


(140-400) 


 


 205 x10^3/uL


(140-400)


 


Neutrophils (%) (Auto) 56 % (31-73)    48 % (31-73) 


 


Lymphocytes (%) (Auto) 26 % (24-48)    34 % (24-48) 


 


Monocytes (%) (Auto) 14 % (0-9)    15 % (0-9) 


 


Eosinophils (%) (Auto) 3 % (0-3)    3 % (0-3) 


 


Basophils (%) (Auto) 2 % (0-3)    1 % (0-3) 


 


Neutrophils # (Auto)


 3.3 x10^3uL


(1.8-7.7) 


 


 3.2 x10^3uL


(1.8-7.7)


 


Lymphocytes # (Auto)


 1.6 x10^3/uL


(1.0-4.8) 


 


 2.3 x10^3/uL


(1.0-4.8)


 


Monocytes # (Auto)


 0.8 x10^3/uL


(0.0-1.1) 


 


 1.0 x10^3/uL


(0.0-1.1)


 


Eosinophils # (Auto)


 0.2 x10^3/uL


(0.0-0.7) 


 


 0.2 x10^3/uL


(0.0-0.7)


 


Basophils # (Auto)


 0.1 x10^3/uL


(0.0-0.2) 


 


 0.1 x10^3/uL


(0.0-0.2)


 


Prothrombin Time


 28.2 SEC


(11.7-14.0) 


 


 





 


Prothromb Time International


Ratio 2.7 (0.8-1.1) 


 


 


 





 


Activated Partial


Thromboplast Time 43 SEC (24-38) 


 


 


 





 


Sodium Level


 142 mmol/L


(136-145) 


 


 143 mmol/L


(136-145)


 


Potassium Level


 4.1 mmol/L


(3.5-5.1) 


 


 3.5 mmol/L


(3.5-5.1)


 


Chloride Level


 107 mmol/L


() 


 


 105 mmol/L


()


 


Carbon Dioxide Level


 24 mmol/L


(21-32) 


 


 28 mmol/L


(21-32)


 


Anion Gap 11 (6-14)    10 (6-14) 


 


Blood Urea Nitrogen


 20 mg/dL


(8-26) 


 


 18 mg/dL


(8-26)


 


Creatinine


 0.9 mg/dL


(0.7-1.3) 


 


 1.1 mg/dL


(0.7-1.3)


 


Estimated GFR


(Cockcroft-Gault) 81.6 


 


 


 64.7 





 


BUN/Creatinine Ratio 22 (6-20)    


 


Glucose Level


 118 mg/dL


(70-99) 


 


 80 mg/dL


(70-99)


 


Calcium Level


 8.3 mg/dL


(8.5-10.1) 


 


 8.2 mg/dL


(8.5-10.1)


 


Total Bilirubin


 0.4 mg/dL


(0.2-1.0) 


 


 





 


Aspartate Amino Transf


(AST/SGOT) 21 U/L (15-37) 


 


 


 





 


Alanine Aminotransferase


(ALT/SGPT) 42 U/L (16-63) 


 


 


 





 


Alkaline Phosphatase


 59 U/L


() 


 


 





 


Troponin I Quantitative


 < 0.017 ng/mL


(0.000-0.055) 


 


 





 


NT-Pro-B-Type Natriuretic


Peptide 3879 pg/mL


(0-449) 


 


 





 


Total Protein


 5.9 g/dL


(6.4-8.2) 


 


 





 


Albumin


 3.2 g/dL


(3.4-5.0) 


 


 





 


Albumin/Globulin Ratio 1.2 (1.0-1.7)    


 


Lipase


 143 U/L


() 


 


 





 


Urine Collection Type  Unknown   


 


Urine Color  Yellow   


 


Urine Clarity  Clear   


 


Urine pH  6.5   


 


Urine Specific Gravity  1.015   


 


Urine Protein


 


 30 mg/dL


(NEG-TRACE) 


 





 


Urine Glucose (UA)


 


 Negative mg/dL


(NEG) 


 





 


Urine Ketones (Stick)


 


 Negative mg/dL


(NEG) 


 





 


Urine Blood  Negative (NEG)   


 


Urine Nitrite  Negative (NEG)   


 


Urine Bilirubin  Negative (NEG)   


 


Urine Urobilinogen Dipstick


 


 1.0 mg/dL (0.2


mg/dL) 


 





 


Urine Leukocyte Esterase  Negative (NEG)   


 


Urine RBC  1-2 /HPF (0-2)   


 


Urine WBC  Occ /HPF (0-4)   


 


Urine Squamous Epithelial


Cells 


 Occ /LPF 


 


 





 


Urine Bacteria  0 /HPF (0-FEW)   


 


Glucose (Fingerstick)


 


 


 130 mg/dL


(70-99) 





 


Magnesium Level


 


 


 


 1.9 mg/dL


(1.8-2.4)


 


Thyroid Stimulating Hormone


(TSH) 


 


 


 2.563 uIU/mL


(0.358-3.74)


 


Test


 6/5/19


08:10 6/5/19


12:02 


 





 


Glucose (Fingerstick)


 89 mg/dL


(70-99) 92 mg/dL


(70-99) 


 














ASSESSMENT/PLAN


ASSESSMENT/PLAN


1. Acute anemia with GI bleed/melena: Hgb initially at 6 then 8.2 post 

transfusion. source remain unclear with S/P EGD


2. Acute on chronic systolic CHF: appears compensated


3. LBBB: no prior for comparison


4. Cardiomyopathy: EF at 30%, no prior for comparison, appears compensated


5. PAFIB: presently on SR. 


6. Hx of CAD; unclear details and no notation of any past stents. 


7. HTN: labile


8. DM2/HLP





Recommendations


1. Future outpt colonoscopy per GI


2. Pt with no CP but will need ischemic workup once GI issues and anemia are 

treated and resolved


3. Will intensify CM meds. Increase lisinopril.  Continue BB and statin


4. Stop coumadin for now and start on ECASA 81 mg daily for stroke prevention if

OK with GI. 


5. Pending w/u both GI and cardiac Future CRT-D and L[facility]O referral is a 

possibility


6. Lasix therapy. Follow up in office.





PIETER TAM MD 6/5/19 5927:


CARDIAC CONSULT


ASSESSMENT/PLAN


ASSESSMENT/PLAN








Patient seen and examined. Agree with NP's assessment and plan.


Acute on chronic systolic heart failure better compensated.


2-D echo showed LVEF 30%.


Plan ischemic evaluation as an outpatient.


Consider biventricular ICD/CRT-D implantation if there is no ischemia on stress 

test.


PAF maintaining sinus rhythm.


Continue workup for anemia per GI team.


Thank you for your consultation.











JULI CAST           Jun 5, 2019 14:45


PIETER TAM MD            Jun 5, 2019 17:15

## 2019-06-05 NOTE — PDOC2
CONSULT


Date of Consult


Date of Consult


DATE: 6/5/19 


TIME: 07:15





Reason for Consult


Reason for Consult:


Anemia/melena





Current Problem List


Problem List


Problems


Medical Problems:


(1) Acute exacerbation of CHF (congestive heart failure)


Status: Acute  





(2) Anemia


Status: Acute  











Current Medications


Current Medications





Current Medications


Iohexol (Omnipaque 350 Mg/ml) 90 ml 1X  ONCE IV  Last administered on 6/4/19at 

17:00;  Start 6/4/19 at 17:00;  Stop 6/4/19 at 17:01;  Status DC


Info (CONTRAST GIVEN -- Rx MONITORING) 1 each PRN DAILY  PRN MC SEE COMMENTS;  

Start 6/4/19 at 17:00;  Stop 6/6/19 at 16:59


Ondansetron HCl (Zofran) 4 mg PRN Q8HRS  PRN IV NAUSEA/VOMITING;  Start 6/4/19 

at 17:15;  Stop 6/5/19 at 17:14


Morphine Sulfate (Morphine Sulfate) 2 mg PRN Q2HR  PRN IV PAIN;  Start 6/4/19 at

17:15;  Stop 6/5/19 at 17:14


Furosemide (Lasix) 20 mg 1X  ONCE IVP  Last administered on 6/4/19at 17:56;  

Start 6/4/19 at 17:45;  Stop 6/4/19 at 17:46;  Status DC


Pantoprazole Sodium (Protonix) 40 mg DAILY08 PO ;  Start 6/4/19 at 21:00





Active Scripts


Active


Reported


Metformin Hcl 500 Mg Tablet 500 Mg PO BIDWMEALS


Atorvastatin Calcium 20 Mg Tablet 20 Mg PO HS


Lisinopril 10 Mg Tablet 1 Tab PO DAILY


Metoprolol Tartrate 50 Mg Tablet 1 Tab PO BID


Warfarin Sodium 2 Mg Tablet 2 Mg PO DAILY


Omeprazole 20 Mg Capsule.dr 1 Cap PO DAILY


Myrbetriq (Mirabegron) 25 Mg Tab.er.24h 25 Mg PO DAILY


Flomax (Tamsulosin Hcl) 0.4 Mg Cap.er.24h 2 Cap PO DAILY


Finasteride 5 Mg Tablet 1 Tab PO DAILY





Allergies


Allergies:  


Coded Allergies:  


     No Known Drug Allergies (Unverified , 6/4/19)





Vitals


VITALS





Vital Signs








  Date Time  Temp Pulse Resp B/P (MAP) Pulse Ox O2 Delivery O2 Flow Rate FiO2


 


6/5/19 03:35 98.4 63 18 177/74 (108) 95 Room Air  





 98.4       











Labs


Labs





Laboratory Tests








Test


 6/4/19


16:12 6/4/19


16:17 6/4/19


21:31 6/5/19


02:55


 


White Blood Count


 6.0 x10^3/uL


(4.0-11.0) 


 


 6.8 x10^3/uL


(4.0-11.0)


 


Red Blood Count


 2.00 x10^6/uL


(4.30-5.70) 


 


 2.74 x10^6/uL


(4.30-5.70)


 


Hemoglobin


 6.0 g/dL


(13.0-17.5) 


 


 8.2 g/dL


(13.0-17.5)


 


Hematocrit


 18.5 %


(39.0-53.0) 


 


 24.3 %


(39.0-53.0)


 


Mean Corpuscular Volume 93 fL ()    89 fL () 


 


Mean Corpuscular Hemoglobin 30 pg (25-35)    30 pg (25-35) 


 


Mean Corpuscular Hemoglobin


Concent 33 g/dL


(31-37) 


 


 34 g/dL


(31-37)


 


Red Cell Distribution Width


 14.9 %


(11.5-14.5) 


 


 16.4 %


(11.5-14.5)


 


Platelet Count


 222 x10^3/uL


(140-400) 


 


 205 x10^3/uL


(140-400)


 


Neutrophils (%) (Auto) 56 % (31-73)    48 % (31-73) 


 


Lymphocytes (%) (Auto) 26 % (24-48)    34 % (24-48) 


 


Monocytes (%) (Auto) 14 % (0-9)    15 % (0-9) 


 


Eosinophils (%) (Auto) 3 % (0-3)    3 % (0-3) 


 


Basophils (%) (Auto) 2 % (0-3)    1 % (0-3) 


 


Neutrophils # (Auto)


 3.3 x10^3uL


(1.8-7.7) 


 


 3.2 x10^3uL


(1.8-7.7)


 


Lymphocytes # (Auto)


 1.6 x10^3/uL


(1.0-4.8) 


 


 2.3 x10^3/uL


(1.0-4.8)


 


Monocytes # (Auto)


 0.8 x10^3/uL


(0.0-1.1) 


 


 1.0 x10^3/uL


(0.0-1.1)


 


Eosinophils # (Auto)


 0.2 x10^3/uL


(0.0-0.7) 


 


 0.2 x10^3/uL


(0.0-0.7)


 


Basophils # (Auto)


 0.1 x10^3/uL


(0.0-0.2) 


 


 0.1 x10^3/uL


(0.0-0.2)


 


Prothrombin Time


 28.2 SEC


(11.7-14.0) 


 


 





 


Prothromb Time International


Ratio 2.7 (0.8-1.1) 


 


 


 





 


Activated Partial


Thromboplast Time 43 SEC (24-38) 


 


 


 





 


Sodium Level


 142 mmol/L


(136-145) 


 


 





 


Potassium Level


 4.1 mmol/L


(3.5-5.1) 


 


 





 


Chloride Level


 107 mmol/L


() 


 


 





 


Carbon Dioxide Level


 24 mmol/L


(21-32) 


 


 





 


Anion Gap 11 (6-14)    


 


Blood Urea Nitrogen


 20 mg/dL


(8-26) 


 


 





 


Creatinine


 0.9 mg/dL


(0.7-1.3) 


 


 





 


Estimated GFR


(Cockcroft-Gault) 81.6 


 


 


 





 


BUN/Creatinine Ratio 22 (6-20)    


 


Glucose Level


 118 mg/dL


(70-99) 


 


 





 


Calcium Level


 8.3 mg/dL


(8.5-10.1) 


 


 





 


Total Bilirubin


 0.4 mg/dL


(0.2-1.0) 


 


 





 


Aspartate Amino Transf


(AST/SGOT) 21 U/L (15-37) 


 


 


 





 


Alanine Aminotransferase


(ALT/SGPT) 42 U/L (16-63) 


 


 


 





 


Alkaline Phosphatase


 59 U/L


() 


 


 





 


Troponin I Quantitative


 < 0.017 ng/mL


(0.000-0.055) 


 


 





 


NT-Pro-B-Type Natriuretic


Peptide 3879 pg/mL


(0-449) 


 


 





 


Total Protein


 5.9 g/dL


(6.4-8.2) 


 


 





 


Albumin


 3.2 g/dL


(3.4-5.0) 


 


 





 


Albumin/Globulin Ratio 1.2 (1.0-1.7)    


 


Lipase


 143 U/L


() 


 


 





 


Urine Collection Type  Unknown   


 


Urine Color  Yellow   


 


Urine Clarity  Clear   


 


Urine pH  6.5   


 


Urine Specific Gravity  1.015   


 


Urine Protein


 


 30 mg/dL


(NEG-TRACE) 


 





 


Urine Glucose (UA)


 


 Negative mg/dL


(NEG) 


 





 


Urine Ketones (Stick)


 


 Negative mg/dL


(NEG) 


 





 


Urine Blood  Negative (NEG)   


 


Urine Nitrite  Negative (NEG)   


 


Urine Bilirubin  Negative (NEG)   


 


Urine Urobilinogen Dipstick


 


 1.0 mg/dL (0.2


mg/dL) 


 





 


Urine Leukocyte Esterase  Negative (NEG)   


 


Urine RBC  1-2 /HPF (0-2)   


 


Urine WBC  Occ /HPF (0-4)   


 


Urine Squamous Epithelial


Cells 


 Occ /LPF 


 


 





 


Urine Bacteria  0 /HPF (0-FEW)   


 


Glucose (Fingerstick)


 


 


 130 mg/dL


(70-99) 











Laboratory Tests








Test


 6/4/19


16:12 6/4/19


16:17 6/4/19


21:31 6/5/19


02:55


 


White Blood Count


 6.0 x10^3/uL


(4.0-11.0) 


 


 6.8 x10^3/uL


(4.0-11.0)


 


Red Blood Count


 2.00 x10^6/uL


(4.30-5.70) 


 


 2.74 x10^6/uL


(4.30-5.70)


 


Hemoglobin


 6.0 g/dL


(13.0-17.5) 


 


 8.2 g/dL


(13.0-17.5)


 


Hematocrit


 18.5 %


(39.0-53.0) 


 


 24.3 %


(39.0-53.0)


 


Mean Corpuscular Volume 93 fL ()    89 fL () 


 


Mean Corpuscular Hemoglobin 30 pg (25-35)    30 pg (25-35) 


 


Mean Corpuscular Hemoglobin


Concent 33 g/dL


(31-37) 


 


 34 g/dL


(31-37)


 


Red Cell Distribution Width


 14.9 %


(11.5-14.5) 


 


 16.4 %


(11.5-14.5)


 


Platelet Count


 222 x10^3/uL


(140-400) 


 


 205 x10^3/uL


(140-400)


 


Neutrophils (%) (Auto) 56 % (31-73)    48 % (31-73) 


 


Lymphocytes (%) (Auto) 26 % (24-48)    34 % (24-48) 


 


Monocytes (%) (Auto) 14 % (0-9)    15 % (0-9) 


 


Eosinophils (%) (Auto) 3 % (0-3)    3 % (0-3) 


 


Basophils (%) (Auto) 2 % (0-3)    1 % (0-3) 


 


Neutrophils # (Auto)


 3.3 x10^3uL


(1.8-7.7) 


 


 3.2 x10^3uL


(1.8-7.7)


 


Lymphocytes # (Auto)


 1.6 x10^3/uL


(1.0-4.8) 


 


 2.3 x10^3/uL


(1.0-4.8)


 


Monocytes # (Auto)


 0.8 x10^3/uL


(0.0-1.1) 


 


 1.0 x10^3/uL


(0.0-1.1)


 


Eosinophils # (Auto)


 0.2 x10^3/uL


(0.0-0.7) 


 


 0.2 x10^3/uL


(0.0-0.7)


 


Basophils # (Auto)


 0.1 x10^3/uL


(0.0-0.2) 


 


 0.1 x10^3/uL


(0.0-0.2)


 


Prothrombin Time


 28.2 SEC


(11.7-14.0) 


 


 





 


Prothromb Time International


Ratio 2.7 (0.8-1.1) 


 


 


 





 


Activated Partial


Thromboplast Time 43 SEC (24-38) 


 


 


 





 


Sodium Level


 142 mmol/L


(136-145) 


 


 





 


Potassium Level


 4.1 mmol/L


(3.5-5.1) 


 


 





 


Chloride Level


 107 mmol/L


() 


 


 





 


Carbon Dioxide Level


 24 mmol/L


(21-32) 


 


 





 


Anion Gap 11 (6-14)    


 


Blood Urea Nitrogen


 20 mg/dL


(8-26) 


 


 





 


Creatinine


 0.9 mg/dL


(0.7-1.3) 


 


 





 


Estimated GFR


(Cockcroft-Gault) 81.6 


 


 


 





 


BUN/Creatinine Ratio 22 (6-20)    


 


Glucose Level


 118 mg/dL


(70-99) 


 


 





 


Calcium Level


 8.3 mg/dL


(8.5-10.1) 


 


 





 


Total Bilirubin


 0.4 mg/dL


(0.2-1.0) 


 


 





 


Aspartate Amino Transf


(AST/SGOT) 21 U/L (15-37) 


 


 


 





 


Alanine Aminotransferase


(ALT/SGPT) 42 U/L (16-63) 


 


 


 





 


Alkaline Phosphatase


 59 U/L


() 


 


 





 


Troponin I Quantitative


 < 0.017 ng/mL


(0.000-0.055) 


 


 





 


NT-Pro-B-Type Natriuretic


Peptide 3879 pg/mL


(0-449) 


 


 





 


Total Protein


 5.9 g/dL


(6.4-8.2) 


 


 





 


Albumin


 3.2 g/dL


(3.4-5.0) 


 


 





 


Albumin/Globulin Ratio 1.2 (1.0-1.7)    


 


Lipase


 143 U/L


() 


 


 





 


Urine Collection Type  Unknown   


 


Urine Color  Yellow   


 


Urine Clarity  Clear   


 


Urine pH  6.5   


 


Urine Specific Gravity  1.015   


 


Urine Protein


 


 30 mg/dL


(NEG-TRACE) 


 





 


Urine Glucose (UA)


 


 Negative mg/dL


(NEG) 


 





 


Urine Ketones (Stick)


 


 Negative mg/dL


(NEG) 


 





 


Urine Blood  Negative (NEG)   


 


Urine Nitrite  Negative (NEG)   


 


Urine Bilirubin  Negative (NEG)   


 


Urine Urobilinogen Dipstick


 


 1.0 mg/dL (0.2


mg/dL) 


 





 


Urine Leukocyte Esterase  Negative (NEG)   


 


Urine RBC  1-2 /HPF (0-2)   


 


Urine WBC  Occ /HPF (0-4)   


 


Urine Squamous Epithelial


Cells 


 Occ /LPF 


 


 





 


Urine Bacteria  0 /HPF (0-FEW)   


 


Glucose (Fingerstick)


 


 


 130 mg/dL


(70-99) 














Assessment/Plan


Assessment/Plan


Anemia- with melena, etiology to be determined. Differential includes: PUD, 

Unger's, IBD, colon/gastric cancer/polyps, AVMS, and/or celiac disease.


Plan transfusional support


       PPI daily


      EGD to further assess. If unrevealing, then o/p colonoscopy to follow.


Full note dictated











DAWN GRANDA MD              Jun 5, 2019 07:17

## 2019-06-05 NOTE — NUR
SW following pt for anticipated dc needs. Chart reviewed and discussed with RN. Pt lives at 
home with family. RN reported pt is standby assist with ambulation. No SW needs at this 
time. Will continue to eval needs. 

-------------------------------------------------------------------------------

Addendum: 06/06/19 at 0902 by MICAH CUETO SW

-------------------------------------------------------------------------------

Pt seen by PT/OT and no skilled needs indicated at this time.

## 2019-06-05 NOTE — RAD
EXAM: CHEST 2 VIEWS.

 

HISTORY: Congestive heart failure.

 

COMPARISON: 06/04/2019.

 

FINDINGS: Frontal and lateral views of the chest are obtained.

 

The left hemidiaphragm is mildly elevated. Mild basilar predominant 

interstitial opacities persist. Small pleural effusions are suspected. 

There is no pneumothorax. The heart is mildly enlarged. There are 

atherosclerotic calcifications of the aorta.

 

IMPRESSION:

1. Mild pulmonary edema persists.

2. Mild cardiomegaly.

 

Electronically signed by: BONNIE Valdez MD (6/5/2019 3:12 PM) [facility]

## 2019-06-05 NOTE — CARD
MR#: [id number]

Account#: [id number]

Accession#: [id number]

Date of Study: 06/05/2019

Ordering Physician: JULI CAST, 

Referring Physician: JONATHAN LU 

Tech: Paige Erwin CHELI





--------------- APPROVED REPORT --------------





EXAM: Two-dimensional and M-mode echocardiogram with Doppler and color Doppler.



Other Information 

Quality : GoodHR: 55bpm

Rhythm : Bradycardia



INDICATION

Congenital Heart Disease 



2D DIMENSIONS 

RVDd3.2 (2.9-3.5cm)Left Atrium(2D)3.6 (1.6-4.0cm)

IVSd1.2 (0.7-1.1cm)Aortic Root(2D)3.4 (2.0-3.7cm)

LVDd5.4 (3.9-5.9cm)LVOT Diameter2.0 (1.8-2.4cm)

PWd1.2 (0.7-1.1cm)IVSs1.3 (0.8-1.2cm)

LVDs4.5 (2.5-4.0cm)FS (%) 17.1 %

PWs1.3 (0.8-1.2cm)SV50.7 ml

LVEF(%)35.0 (>50%)



M-Mode DIMENSIONS 

Left Atrium(MM)3.48 (2.5-4.0cm)Aortic Root3.88 (2.2-3.7cm)



Aortic Valve

AoV Peak Sylvain.114.5cm/sAoV VTI22.7cm

AO Peak GR.5.2mmHgLVOT Peak Sylvain.67.1cm/s

LVOT  VTI 14.58cmAO Mean GR.3mmHg

RAYMON (VMAX)1.03ih8YXT   (VTI)1.98cm2



Mitral Valve

MV E Uokilivc69.8cm/sMV E Peak Gr.94mmHg

MV DECEL [id number] A Sxplwygo18.9cm/s

MV VNC66uzQ/A  Ratio2.7

MVA (PHT)3.90cm2



Pulmonary Valve

PV Peak Nztfifve76.8cm/sPV Peak Grad.1mmHg



Tricuspid Valve

TR P. Okktogja393md/sRAP QAUHFVTX8uiZk

TR Peak Gr.35tiUsIDLB54wzNt



Pulmonary Vein

S1 Dblqktld61.8cm/sD2 Lcmfpkof52.4cm/s



 LEFT VENTRICLE 

The left ventricle is normal size. There is borderline to mild concentric left ventricular hypertroph
y. The left ventricular systolic function is moderately impaired. The Ejection Fraction is 30%. There
 is global hypokinesis of the left ventricle. Tissue Doppler imaging reveals moderate left ventricula
r diastolic dysfunction.



 RIGHT VENTRICLE 

The right ventricle is normal size. There is normal right ventricular wall thickness. The right ventr
icular systolic function is normal.



 ATRIA 

The left atrium size is normal. The right atrium size is normal. The interatrial septum is intact wit
h no evidence for an atrial septal defect or patent foramen ovale as noted on 2-D or Doppler imaging.




 AORTIC VALVE 

The aortic valve is normal in structure and function. The aortic valve is trileaflet. Doppler and Col
or Flow revealed trace aortic regurgitation. There is no significant aortic valvular stenosis. There 
is no aortic valvular vegetation.



 MITRAL VALVE 

The mitral valve is normal in structure and function. There is no evidence of mitral valve prolapse. 
There is no mitral valve stenosis. Doppler and Color-flow revealed mild to moderate mitral regurgitat
ion.



 TRICUSPID VALVE 

The tricuspid valve is normal in structure and function. Doppler and Color Flow revealed mild tricusp
id regurgitation. There is mild pulmonary hypertension. The PA pressure was estimated at 37 mmHg. The
re is no tricuspid valve prolapse or vegetation. There is no tricuspid valve stenosis.



 PULMONIC VALVE 

The pulmonic valve is not well visualized.



 GREAT VESSELS 

The aortic root is normal in size. The ascending aorta is normal in size. The IVC is normal in size a
nd collapses >50% with inspiration.



 PERICARDIAL EFFUSION 

There is no evidence of significant pericardial effusion.



Critical Notification

Critical Value: No



<Conclusion>

The left ventricular systolic function is moderately impaired.

The Ejection Fraction is 30%.

Trace aortic regurgitation.

Mild to moderate mitral regurgitation.

Mild tricuspid regurgitation.

The PA pressure was estimated at 37 mmHg.

There is no evidence of significant pericardial effusion.



Signed by : Titi Hinkle, 

Electronically Approved : 06/05/2019 12:12:57

## 2019-06-05 NOTE — PDOC4
Operative Note


Operative Note


EGD


Meds propofol per anesthesia


Pre-op dx anemia/melena


post-op dx non-erosive gastritis


Plan advance diet


       o/p colonoscopy to further assess











DAWN GRANDA MD              Jun 5, 2019 07:28

## 2019-06-06 NOTE — NUR
Discharge instructions given to patient and his daughter. Discussed new medications, signs 
and symptoms of anemia and heart failure, and the follow up appointments with Dr. Hinkle 
and Dr. Mckeon. Scripts and contact information given. Patient and daughter verbalized 
understanding.

## 2019-06-06 NOTE — PDOC
Subjective:


Subjective:


Indicates eating okay, denies pain and bleeding.





Objective:


Objective:


D/w cardiology yesterday - ASA instead of Coumadin preferred.


Vital Signs:





                                   Vital Signs








  Date Time  Temp Pulse Resp B/P (MAP) Pulse Ox O2 Delivery O2 Flow Rate FiO2


 


6/6/19 09:27  55  166/65    


 


6/6/19 08:00      Room Air 2.0 


 


6/6/19 07:15 98.2  16  96   





 98.2       








Labs:





Laboratory Tests








Test


 6/5/19


12:02 6/5/19


16:36 6/5/19


20:27 6/6/19


03:45


 


Glucose (Fingerstick) 92 mg/dL  117 mg/dL  113 mg/dL  


 


White Blood Count    5.2 x10^3/uL 


 


Red Blood Count    2.84 x10^6/uL 


 


Hemoglobin    8.4 g/dL 


 


Hematocrit    24.8 % 


 


Mean Corpuscular Volume    87 fL 


 


Mean Corpuscular Hemoglobin    30 pg 


 


Mean Corpuscular Hemoglobin


Concent 


 


 


 34 g/dL 





 


Red Cell Distribution Width    16.7 % 


 


Platelet Count    208 x10^3/uL 


 


Neutrophils (%) (Auto)    42 % 


 


Lymphocytes (%) (Auto)    36 % 


 


Monocytes (%) (Auto)    15 % 


 


Eosinophils (%) (Auto)    6 % 


 


Basophils (%) (Auto)    1 % 


 


Neutrophils # (Auto)    2.2 x10^3uL 


 


Lymphocytes # (Auto)    1.9 x10^3/uL 


 


Monocytes # (Auto)    0.8 x10^3/uL 


 


Eosinophils # (Auto)    0.3 x10^3/uL 


 


Basophils # (Auto)    0.1 x10^3/uL 


 


Sodium Level    142 mmol/L 


 


Potassium Level    3.8 mmol/L 


 


Chloride Level    107 mmol/L 


 


Carbon Dioxide Level    26 mmol/L 


 


Anion Gap    9 


 


Blood Urea Nitrogen    19 mg/dL 


 


Creatinine    1.0 mg/dL 


 


Estimated GFR


(Cockcroft-Gault) 


 


 


 72.3 





 


Glucose Level    76 mg/dL 


 


Calcium Level    8.5 mg/dL 


 


Triglycerides Level    33 mg/dL 


 


Cholesterol Level    106 mg/dL 


 


LDL Cholesterol, Calculated    53 mg/dL 


 


VLDL Cholesterol, Calculated    7 mg/dL 


 


Non-HDL Cholesterol Calculated    60 mg/dL 


 


HDL Cholesterol    46 mg/dL 


 


Cholesterol/HDL Ratio    2.3 


 


Test


 6/6/19


07:37 


 


 





 


Glucose (Fingerstick) 80 mg/dL    








Imaging:


EGD 6/5


non-erosive gastritis





Echocardiogram


<Conclusion>


The left ventricular systolic function is moderately impaired.


The Ejection Fraction is 30%.


Trace aortic regurgitation.


Mild to moderate mitral regurgitation.


Mild tricuspid regurgitation.


The PA pressure was estimated at 37 mmHg.


There is no evidence of significant pericardial effusion.





PE:





GEN: NAD, up to chair


LUNGS: room air


ABD: S/ND/NT


NEURO/PSYCH: A & O 3





A/P:


Anemia, melena - Hgb stable


Cardiomyopathy, PAFib, h/o CAD





--


Plans for DC - okay per GI.


Follow-up for outpt colonoscopy - our office will arrange.


Consider iron.











TEODORA BALL          Jun 6, 2019 11:17

## 2019-06-06 NOTE — CONS
DATE OF CONSULTATION:  



GASTROENTEROLOGY CONSULTATION



REFERRING PHYSICIAN:  Juan Diego Barber M.D.



REASON FOR CONSULTATION:  Anemia.



HISTORY OF PRESENT ILLNESS:  A 78-year-old  male who is admitted to

[facility] with about a 2- to 3-week history of fatigue,

shortness of air and dizziness.  Family said that the patient also began having

neurologic signs suggestive of stroke with a facial droop, but no dysphagia or

odynophagia.  There has been some blood in the stools.  His hemoglobin was found

to be 6.  He, otherwise, gives minimal additional history.  No additional

complaints.



PAST MEDICAL HISTORY:  History of CVA, history of anemia and congestive heart

failure.



MEDICATIONS:  Presently are Protonix and morphine.



SOCIAL HISTORY:  Nondrinker, nonsmoker.



FAMILY HISTORY:  Not obtainable.



REVIEW OF SYSTEMS:  As per records.



PHYSICAL EXAMINATION:

VITAL SIGNS:  Temperature is 98.4, pulse 63, respiratory rate 18 and blood

pressure 177/74.

HEENT EXAMINATION:  Normocephalic and atraumatic head.  Pupils and extraocular

muscles are not tested.  Sclerae anicteric.

NECK:  Supple.

LUNGS:  Clear.

CARDIOVASCULAR EXAMINATION:  Reveals an S1 and S2, without S3, S4 or appreciable

murmur.

ABDOMEN:  Exam reveals a soft abdomen.  Normoactive bowel sounds, without

appreciable hepatosplenomegaly.

EXTREMITIES:  Exam reveals no cyanosis, clubbing or edema.



LABORATORY STUDIES:  INR was 2.7.  Sodium 142, potassium 4.1, chloride 107,

bicarb is 24, BUN 20, creatinine 0.9, glucose 118 and calcium is 8.3.  Total

bilirubin 0.4, AST of 21, ALT of 42 and alk phos of 89.  Troponin 0.017.  Total

protein 5.9.  Albumin 3.2.  Lipase 143.  Hemoglobin is 8.2, hematocrit 24.3,

white count 6.8 and platelet count is 205,000.



IMPRESSION AND PLAN:  Anemia, most likely iron deficiency in nature.  We will

recommend endoscopic evaluation after transfusion of several units of blood. 

The patient will be on PPI therapy.  We will proceed with EGD today to further

assess.  If this is unrevealing, an outpatient colonoscopy to further assess the

anemia would be pursued.



I would like to thank Dr. Barber for allowing us to consult and participate in

this patient's care.

 



______________________________

DAWN GRANDA MD



DR:  SSP/pema  JOB#:  [id number] / [id number]

DD:  06/05/2019 07:15  DT:  06/06/2019 01:06

## 2019-06-06 NOTE — PDOC
STANISLAW ESQUIVEL APRBRANDIE 6/6/19 1309:


CARDIO Progress Notes


Date and Time


Date of Service


6/6/19


Time of Evaluation


1310





Subjective


Subjective:  No Chest Pain, No shortness of breath





Vitals


Vitals





Vital Signs








  Date Time  Temp Pulse Resp B/P (MAP) Pulse Ox O2 Delivery O2 Flow Rate FiO2


 


6/6/19 11:20 97.9 60 16 136/70 (92) 99 Room Air  





 97.9       


 


6/6/19 08:00       2.0 








Weight


Weight [ ]





Input and Output


Intake and Output











Intake and Output 


 


 6/6/19





 06:59


 


Intake Total 1400 ml


 


Output Total 625 ml


 


Balance 775 ml


 


 


 


Intake Oral 1400 ml


 


Output Urine Total 625 ml


 


# Voids 4











Laboratory


Labs





Laboratory Tests








Test


 6/5/19


16:36 6/5/19


20:27 6/6/19


03:45 6/6/19


07:37


 


Glucose (Fingerstick)


 117 mg/dL


(70-99) 113 mg/dL


(70-99) 


 80 mg/dL


(70-99)


 


White Blood Count


 


 


 5.2 x10^3/uL


(4.0-11.0) 





 


Red Blood Count


 


 


 2.84 x10^6/uL


(4.30-5.70) 





 


Hemoglobin


 


 


 8.4 g/dL


(13.0-17.5) 





 


Hematocrit


 


 


 24.8 %


(39.0-53.0) 





 


Mean Corpuscular Volume   87 fL ()  


 


Mean Corpuscular Hemoglobin   30 pg (25-35)  


 


Mean Corpuscular Hemoglobin


Concent 


 


 34 g/dL


(31-37) 





 


Red Cell Distribution Width


 


 


 16.7 %


(11.5-14.5) 





 


Platelet Count


 


 


 208 x10^3/uL


(140-400) 





 


Neutrophils (%) (Auto)   42 % (31-73)  


 


Lymphocytes (%) (Auto)   36 % (24-48)  


 


Monocytes (%) (Auto)   15 % (0-9)  


 


Eosinophils (%) (Auto)   6 % (0-3)  


 


Basophils (%) (Auto)   1 % (0-3)  


 


Neutrophils # (Auto)


 


 


 2.2 x10^3uL


(1.8-7.7) 





 


Lymphocytes # (Auto)


 


 


 1.9 x10^3/uL


(1.0-4.8) 





 


Monocytes # (Auto)


 


 


 0.8 x10^3/uL


(0.0-1.1) 





 


Eosinophils # (Auto)


 


 


 0.3 x10^3/uL


(0.0-0.7) 





 


Basophils # (Auto)


 


 


 0.1 x10^3/uL


(0.0-0.2) 





 


Sodium Level


 


 


 142 mmol/L


(136-145) 





 


Potassium Level


 


 


 3.8 mmol/L


(3.5-5.1) 





 


Chloride Level


 


 


 107 mmol/L


() 





 


Carbon Dioxide Level


 


 


 26 mmol/L


(21-32) 





 


Anion Gap   9 (6-14)  


 


Blood Urea Nitrogen


 


 


 19 mg/dL


(8-26) 





 


Creatinine


 


 


 1.0 mg/dL


(0.7-1.3) 





 


Estimated GFR


(Cockcroft-Gault) 


 


 72.3 


 





 


Glucose Level


 


 


 76 mg/dL


(70-99) 





 


Calcium Level


 


 


 8.5 mg/dL


(8.5-10.1) 





 


Triglycerides Level


 


 


 33 mg/dL


(0-150) 





 


Cholesterol Level


 


 


 106 mg/dL


(0-200) 





 


LDL Cholesterol, Calculated


 


 


 53 mg/dL


(0-100) 





 


VLDL Cholesterol, Calculated   7 mg/dL (0-40)  


 


Non-HDL Cholesterol Calculated


 


 


 60 mg/dL


(0-129) 





 


HDL Cholesterol


 


 


 46 mg/dL


(40-60) 





 


Cholesterol/HDL Ratio   2.3  


 


Test


 6/6/19


11:21 


 


 





 


Glucose (Fingerstick)


 122 mg/dL


(70-99) 


 


 














Physical Exam


HEENT:  Neck Supple W Full Motion


Chest:  Symmetric


LUNGS:  Clear to Auscultation


Heart:  S1S2, RRR, murmurs (2/6 systolic murmur)


Abdomen:  Soft N/T


Extremities:  No Edema


Neurology:  alert, oriented, follow commands





Assessment


Assessment


1. Acute anemia with GI bleed/melena: Hgb initially at 6 then 8.2 post 

transfusion. Stable at 8.4. source remain unclear with S/P EGD


2. Acute on chronic systolic CHF: appears compensated


3. LBBB: no prior for comparison


4. Cardiomyopathy: EF at 30%, no prior for comparison, appears compensated


5. PAFIB: presently on SR. 


6. Hx of CAD; details unclear


7. HTN: better


8. DM2/HLP





Recommendations





Outpatient ischemic eval following outpatient GI workup


Consider biventricular ICD/CRT-D implantation if there is no ischemia on stress 

test.


Optimization therapy; ACEi, BB, lasix


Continue ASA, statin


Consider referral for ANIA closure device


Follow up in our office with Dr. Hinkle as scheduled





PIETER HINKLE MD 6/6/19 1630:


CARDIO Progress Notes


Assessment


Assessment


Patient seen and examined. Agree with NP's assessment and plan.


Acute on chronic systolic heart failure better compensated.


2-D echo showed LVEF 30%.


Plan outpatient ischemic evaluation with stress test. 


Agree with outpatient consideration for biventricular ICD and LAAO











STANISLAW ESQUIVEL            Jun 6, 2019 13:09


PIETER HINKLE MD            Jun 6, 2019 16:30

## 2019-09-05 NOTE — RAD
MR#: [id number]

Account#: [id number]

Accession#: [id number]

Date of Study: 09/03/2019

Ordering Physician: PIETER TAM, 

Referring Physician: ALEJANDRO BEYER Tech: RT Binh VillaltaR) (N)





--------------- APPROVED REPORT --------------





Test Type:          Pharmacological

Stress Nurse/Tech: Jelena Burnett R.N.

Test Indications: cad

Cardiac History: cad, mi-2001, htn, dm

Medications:     see ehr

Medical History: see ehr

Resting ECG:     SR, pvc noted, LBBB

Resting Heart Rate: 54 bpm

Resting Blood Pressure: 159/68mmHg

Pretest Chest Pain: No chest pain



Nurse/Tech Notes

lungs cta, heart tones regular

Consent: The procedure was explained to the patient in lay terms. Informed consent was witnessed. Josef
eout was entered into [employer]. History and Stress Test performed by CHELSEY Ruiz



Pharm. Details

Pharmacologic stress testing was performed using 0.4mg per 5ml of regadenoson given intravenously ove
r 7-10 seconds.



Stress Symptoms

No chest pain or symptoms.



POST EXERCISE

Reason for Termination: Infusion complete

Target HR: No

Max HR: 91 bpm

Max Blood Pressure: 161/55mmHg

Chest Pain: No. 

Arrhythmia: Yes. few multi focal PVCs noted

ST Change: No. 



INTERPRETATION

Stress EKG Conclusion: No evidence of significant ischemia based on EKG



Imaging Protocol

IMAGE PROTOCOL: Rest Tc-99m/stress Tc-99m 1 day



Rest:            Stress:         Viability:   

Radiopharm.Tc99m RqitmuezoLi84w Sestamibi

Dose7.3mCi            27.7mCi            

Duration    15min.           13min.           

Img Date  09/03/2019 09/03/2019      

Inj-Img Emsi40nve.           60min.           



Rest Admin Site:IV - Left AntecubitalAdministrator:RT MATILDE Villalta)(N)

Stress Admin Site: IV - Left AntecubitalAdministrator: CHELSEY Ruiz



STRESS DATA

End Diast. Vol.169.0mlAv. Heart Rate59.0bpm

End Syst. Vol.92.0mlCO Index BSA0.0L/min

Myocardial Ynkc465.0gEject. Wrgonsgk71.0%



Stress Rates

Pk. Fill Rate1.61EDV/secLVtime Pk. Fill 209.70msec

Pk. Empty Rate1.50ESV/secLVtime Pk. Zhdqc197.04msec

1/3 Pk. Fill0.65EDV/sec



Stress Scores

Regional WT1.00Summed WT18.00

Regional WM0.00Summed WM31.00



LV Perfusion

There is a large sized mostly fixed inferior wall defect. This appears to be non-ischemic in nature a
s it appears worse on rest images than stress. 



Wall Motion

There is moderate global hypokinesis with an EF of 45%



LV Perf. Quant

17 Seg. SSS7.00

17 Seg. SRS11.00

17 Seg. SDS2.00

Stress Defect Extent (% LAD)12.50Rest Defect Extent (% LAD)17.50Rev. Defect Extent (% LAD)0.00

Stress Defect Extent (% LCX) 8.80Rest Defect Extent (% LCX)0.00Rev. Defect Extent (% LCX)0.00

Stress Defect Extent (% RCA)2.20Rest Defect Extent (% RCA)31.10Rev. Defect Extent (% RCA)0.00

Stress Defect Extent (% MOHAMUD)8.30Rest Defect Extent (% MOHAMUD)16.30Rev. Defect Extent (% MOHAMUD)0.00



Other Information

Quality:Fair

Risk Assessment: Moderate Risk



Conclusion

1. No evidence of stress induced EKG changes

2. Fixed inferior wall defect. Suspect diaphragmatic attenuation versus non-ischemic pattern of perfu
brett.

3. Moderate LV dysfunction. EF 45%

4. Moderate risk for future CV events.



Signed by : Chavez Moore, 

Electronically Approved : 09/03/2019 15:02:36